# Patient Record
Sex: FEMALE | Race: WHITE | NOT HISPANIC OR LATINO | Employment: FULL TIME | URBAN - METROPOLITAN AREA
[De-identification: names, ages, dates, MRNs, and addresses within clinical notes are randomized per-mention and may not be internally consistent; named-entity substitution may affect disease eponyms.]

---

## 2021-08-19 ENCOUNTER — THERAPY VISIT (OUTPATIENT)
Dept: PHYSICAL THERAPY | Facility: CLINIC | Age: 56
End: 2021-08-19
Payer: COMMERCIAL

## 2021-08-19 DIAGNOSIS — M54.50 CHRONIC BILATERAL LOW BACK PAIN WITHOUT SCIATICA: ICD-10-CM

## 2021-08-19 DIAGNOSIS — G89.29 CHRONIC BILATERAL LOW BACK PAIN WITHOUT SCIATICA: ICD-10-CM

## 2021-08-19 PROCEDURE — 97110 THERAPEUTIC EXERCISES: CPT | Mod: GP | Performed by: PHYSICAL THERAPIST

## 2021-08-19 PROCEDURE — 97161 PT EVAL LOW COMPLEX 20 MIN: CPT | Mod: GP | Performed by: PHYSICAL THERAPIST

## 2021-08-19 NOTE — PROGRESS NOTES
[facility] Initial Evaluation     Present: no    Subjective:  Elly Gonzalez is a 56 year old female with a lumbar condition. Pt reports that she's had back pain on and off over the years without known cause. 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 Chief complaint is worst in the morning, sitting, lifting and with certain postures. Would like to be able to do the rowing machine and build up activity pain free. Denies vague symptoms.      Symptoms commenced as a result of: sitting while working. Condition occurred in the following environment: home/other. Onset of symptoms: Jan 2021. Location of symptoms: Lower lumbar right more than left. Pain level on number scale: 2/10. Quality of pain: shooting, ache. Associated symptoms: groin pain on the left at times. Pain frequency (constant/intermittent): intermittent. Symptoms are exacerbated by: first thing,sitting position, bending, twisting, vacuuming, lifting. Symptoms are relieved by: ice(short term). Progression of symptoms since onset (same/better/worse): same. Special tests (x-ray, MRI, CT scan, EMG, bone scan): Xray unlimited. Previous treatment: Chiro. Improvement with previous treatment: yes not long lasting. General health as reported by patient is good. Pertinent medical history includes: See Epic. Medical allergies: see Epic. Other pertinent surgeries: see Epic. Current medications: See Epic. Occupation: Professor. Patient is (working in normal job without restrictions/working in normal job with restrictions/working in an alternate job/not working due to present treatment problem): normal from home. Primary job tasks: sitting, computer work. Barriers at home/work: None reported by patient. Red flags: None reported by patient.    Objective  Posture: forward head, rounded shoulders    Gait: normal    Screening: + dis-concordant hip pain left (+ FADDIR, +  EFRAÍN)    Flexibility: unremarkable    Lumbar Movement Loss Response   Flexion To the floor pain free and with repeated motion no change   Extension Slightly limited slight pain in the lower lumbar right and with repeated motion getting a bit worse   Side bending/glide L Slightly limited pain lumbar left   Side bending/glide R Full no pain   Rotation L Full thoracic pain   Rotation R Full thoracic pain    Quadrants (if applicable)      Hip PROM/Strength: ROM R flexion 115(slight lumbar pain) IR 20, ER 35. L hip flexion 120, IR 15*, ER 25*    Neurological:    Myotomes L R   L1-2 (hip flexion) 5/5 5/5   L3 (knee extension) 5/5 5/5   L4 (ankle DF) 5/5 5/5   L5 (g. toe ext) 5/5 5/5   S1 (ankle PF or knee flex) 5/5 5/5     Sensory Deficit: unremarkable    Dural Signs L R   Slump - -   SLR         Palpation: tao lumbar paraspinals mid and lower    Prone Assessment: MARLYN hard to get in but improves with prolonged positioning, Pressups slightly limited and with repeated motion increased pain    Accessory Motion: CPA L1-L2,L4, L5 tender and worse with repeated motion    Functional Squat and Balance: poor squat with back pain and SLS fair on the R good on the L     Other Tests: none      Assessment/Plan:    Patient is a 56 year old female with lumbar complaints.    Patient has the following significant findings with corresponding treatment plan.                Diagnosis 1:  Chronic lumbar pain  Pain -  manual therapy, self management, education, directional preference exercise and home program  Decreased ROM/flexibility - manual therapy and therapeutic exercise  Decreased joint mobility - manual therapy and therapeutic exercise  Decreased strength - therapeutic exercise and therapeutic activities  Impaired muscle performance - neuro re-education  Decreased function - therapeutic activities  Impaired posture - neuro re-education    Therapy Evaluation Codes:   1) History comprised of:   Personal factors that impact the plan of  care:      Time since onset of symptoms.    Comorbidity factors that impact the plan of care are:      Migraines/headaches.     Medications impacting care: None.  2) Examination of Body Systems comprised of:   Body structures and functions that impact the plan of care:      Cervical spine, Elbow and Lumbar spine.   Activity limitations that impact the plan of care are:      Bending, Lifting, Sitting, Squatting/kneeling, Stairs, Standing, Working, Sleeping and Laying down.  3) Clinical presentation characteristics are:   Stable/Uncomplicated.  4) Decision-Making    Low complexity using standardized patient assessment instrument and/or measureable assessment of functional outcome.  Cumulative Therapy Evaluation is: Low complexity.    Previous and current functional limitations:  (See Goal Flow Sheet for this information)    Short term and Long term goals: (See Goal Flow Sheet for this information)     Communication ability:  Patient appears to be able to clearly communicate and understand verbal and written communication and follow directions correctly.  Treatment Explanation - The following has been discussed with the patient:   RX ordered/plan of care  Anticipated outcomes  Possible risks and side effects  This patient would benefit from PT intervention to resume normal activities.   Rehab potential is good.    Frequency:  1 X week, once daily  Duration:  for 6 weeks  Discharge Plan:  Achieve all LTG.  Independent in home treatment program.  Reach maximal therapeutic benefit.    Please refer to the daily flowsheet for treatment today, total treatment time and time spent performing 1:1 timed codes.     Please Contact me with any questions or concerns. Thank you for for patience and cooperation.     Bernardo Saunders PT, DPT, [facility]  Physical Therapist  [facility] [place]  [phone]

## 2021-08-24 ENCOUNTER — HOSPITAL ENCOUNTER (OUTPATIENT)
Dept: MAMMOGRAPHY | Facility: CLINIC | Age: 56
Discharge: HOME | End: 2021-08-24
Admitting: NURSE PRACTITIONER
Payer: COMMERCIAL

## 2021-08-24 DIAGNOSIS — Z12.31 VISIT FOR SCREENING MAMMOGRAM: ICD-10-CM

## 2021-08-24 PROCEDURE — 77063 BREAST TOMOSYNTHESIS BI: CPT

## 2021-09-01 ENCOUNTER — THERAPY VISIT (OUTPATIENT)
Dept: PHYSICAL THERAPY | Facility: CLINIC | Age: 56
End: 2021-09-01
Payer: COMMERCIAL

## 2021-09-01 DIAGNOSIS — M54.50 CHRONIC BILATERAL LOW BACK PAIN WITHOUT SCIATICA: Primary | ICD-10-CM

## 2021-09-01 DIAGNOSIS — G89.29 CHRONIC BILATERAL LOW BACK PAIN WITHOUT SCIATICA: Primary | ICD-10-CM

## 2021-09-01 PROCEDURE — 97110 THERAPEUTIC EXERCISES: CPT | Mod: GP | Performed by: PHYSICAL THERAPIST

## 2021-09-01 PROCEDURE — 97140 MANUAL THERAPY 1/> REGIONS: CPT | Mod: GP | Performed by: PHYSICAL THERAPIST

## 2021-09-16 ENCOUNTER — HOSPITAL ENCOUNTER (OUTPATIENT)
Dept: MAMMOGRAPHY | Facility: CLINIC | Age: 56
Discharge: HOME | End: 2021-09-16
Attending: INTERNAL MEDICINE | Admitting: INTERNAL MEDICINE
Payer: COMMERCIAL

## 2021-09-16 DIAGNOSIS — R92.8 ABNORMAL MAMMOGRAM: ICD-10-CM

## 2021-09-16 PROCEDURE — 76642 ULTRASOUND BREAST LIMITED: CPT | Mod: RT

## 2021-09-23 ENCOUNTER — HOSPITAL ENCOUNTER (OUTPATIENT)
Dept: MAMMOGRAPHY | Facility: CLINIC | Age: 56
End: 2021-09-23
Attending: INTERNAL MEDICINE
Payer: COMMERCIAL

## 2021-09-23 DIAGNOSIS — R92.8 ABNORMAL MAMMOGRAM: ICD-10-CM

## 2021-09-23 PROCEDURE — 250N000009 HC RX 250: Performed by: INTERNAL MEDICINE

## 2021-09-23 PROCEDURE — 999N000065 MA POST PROCEDURE RIGHT

## 2021-09-23 PROCEDURE — 272N000031 US BREAST BIOPSY CORE NEEDLE RIGHT

## 2021-09-23 PROCEDURE — 88305 TISSUE EXAM BY PATHOLOGIST: CPT | Mod: TC | Performed by: INTERNAL MEDICINE

## 2021-09-23 RX ORDER — CETIRIZINE HYDROCHLORIDE 10 MG/1
10 TABLET ORAL DAILY
COMMUNITY

## 2021-09-23 RX ADMIN — LIDOCAINE HYDROCHLORIDE 5 ML: 10 INJECTION, SOLUTION INFILTRATION; PERINEURAL at 08:21

## 2021-09-23 NOTE — DISCHARGE INSTRUCTIONS
After Your Breast Biopsy    Bleeding or bruising: Slight bruising is normal.  If you bleed through the bandage, put direct pressure on the breast.  If you are still bleeding after 20 minutes, call the doctor who ordered the exam.    Bandages: Keep your bandage in place until tomorrow morning.  Do not get it wet. On the second day, cover it with a Band-Aid.    Activity: You may shower the morning after the exam.  No heavy activity (lifting, vacuuming) for 24 hours.    Discomfort: Wear your bra overnight to support the breast.  You may take Tylenol (acetaminophen) for pain.  If you had a stereotactic of MR-directed biopsy, you may take aspirin or ibuprofen (Advil, Motrin) the morning after your biopsy, unless your doctor tells you not to.    Infection: Infection is rare.  Symptoms include fever, redness, increasing pain and fluid draining from the biopsy site.  If you have any of these symptoms, please call the doctor who ordered your exam.    Results: Results may take up to three business days.  If you have not heard your results in three days, call the Breast Center Nurse at [phone] or [phone].  In rare cases, we may need to do another biopsy.    Call the doctor who ordered your exam if:    You have bleeding that lasts more than 20 minutes.    You have pain that cannot be controlled.    You have signs of infection (fever, redness, drainage or other signs).    You have not had your results within three days.    Nurse navigator: Our nurse navigator is here to answer your questions and help you set up future clinic visits.  Please call [phone].    Thank you for choosing [facility].  Please call us if you have questions or concerns about your biopsy.

## 2021-09-24 LAB
PATH REPORT.COMMENTS IMP SPEC: NORMAL
PATH REPORT.COMMENTS IMP SPEC: NORMAL
PATH REPORT.FINAL DX SPEC: NORMAL
PATH REPORT.GROSS SPEC: NORMAL
PATH REPORT.MICROSCOPIC SPEC OTHER STN: NORMAL
PHOTO IMAGE: NORMAL

## 2021-09-24 PROCEDURE — 88305 TISSUE EXAM BY PATHOLOGIST: CPT | Mod: 26 | Performed by: PATHOLOGY

## 2021-09-24 NOTE — PROGRESS NOTES
After review by [facility] Radiologist, Dr. Earnest Quintanilla, Elly was called and  given her 9/23/2021 Right Breast Biopsy Pathology results (Fibrocystic Changes) and Follow up Recommendations (Annual Screening Mamnmogram).  Elly denies any post biopsy site issues. I encouraged her to notify her doctor if any breast changes or concerns arise.    Final Diagnosis  Breast, 11 o'clock position and 6 cm from the nipple, 0.7 cm size, intermediate suspicion, ultrasound-guided core biopsy:  -Negative for atypia or malignancy.  -Benign breast tissue with mildly proliferative fibrocystic changes    Chantel Koroma BSN, RN  Procedure Nurse  [facility]  [phone]

## 2021-09-30 ENCOUNTER — THERAPY VISIT (OUTPATIENT)
Dept: PHYSICAL THERAPY | Facility: CLINIC | Age: 56
End: 2021-09-30
Payer: COMMERCIAL

## 2021-09-30 DIAGNOSIS — G89.29 CHRONIC BILATERAL LOW BACK PAIN WITHOUT SCIATICA: Primary | ICD-10-CM

## 2021-09-30 DIAGNOSIS — M54.50 CHRONIC BILATERAL LOW BACK PAIN WITHOUT SCIATICA: Primary | ICD-10-CM

## 2021-09-30 PROCEDURE — 97110 THERAPEUTIC EXERCISES: CPT | Mod: GP | Performed by: PHYSICAL THERAPIST

## 2021-09-30 PROCEDURE — 97140 MANUAL THERAPY 1/> REGIONS: CPT | Mod: GP | Performed by: PHYSICAL THERAPIST

## 2021-10-07 ENCOUNTER — THERAPY VISIT (OUTPATIENT)
Dept: PHYSICAL THERAPY | Facility: CLINIC | Age: 56
End: 2021-10-07
Payer: COMMERCIAL

## 2021-10-07 DIAGNOSIS — M54.50 CHRONIC BILATERAL LOW BACK PAIN WITHOUT SCIATICA: Primary | ICD-10-CM

## 2021-10-07 DIAGNOSIS — G89.29 CHRONIC BILATERAL LOW BACK PAIN WITHOUT SCIATICA: Primary | ICD-10-CM

## 2021-10-07 PROCEDURE — 97110 THERAPEUTIC EXERCISES: CPT | Mod: GP | Performed by: PHYSICAL THERAPIST

## 2021-10-07 PROCEDURE — 97140 MANUAL THERAPY 1/> REGIONS: CPT | Mod: GP | Performed by: PHYSICAL THERAPIST

## 2021-10-11 ENCOUNTER — HEALTH MAINTENANCE LETTER (OUTPATIENT)
Age: 56
End: 2021-10-11

## 2021-11-04 ENCOUNTER — THERAPY VISIT (OUTPATIENT)
Dept: PHYSICAL THERAPY | Facility: CLINIC | Age: 56
End: 2021-11-04
Payer: COMMERCIAL

## 2021-11-04 DIAGNOSIS — M54.50 CHRONIC BILATERAL LOW BACK PAIN WITHOUT SCIATICA: ICD-10-CM

## 2021-11-04 DIAGNOSIS — G89.29 CHRONIC BILATERAL LOW BACK PAIN WITHOUT SCIATICA: ICD-10-CM

## 2021-11-04 PROCEDURE — 97110 THERAPEUTIC EXERCISES: CPT | Mod: GP | Performed by: PHYSICAL THERAPIST

## 2021-11-04 PROCEDURE — 97112 NEUROMUSCULAR REEDUCATION: CPT | Mod: GP | Performed by: PHYSICAL THERAPIST

## 2021-12-02 ENCOUNTER — THERAPY VISIT (OUTPATIENT)
Dept: PHYSICAL THERAPY | Facility: CLINIC | Age: 56
End: 2021-12-02
Payer: COMMERCIAL

## 2021-12-02 DIAGNOSIS — M54.50 CHRONIC BILATERAL LOW BACK PAIN WITHOUT SCIATICA: ICD-10-CM

## 2021-12-02 DIAGNOSIS — G89.29 CHRONIC BILATERAL LOW BACK PAIN WITHOUT SCIATICA: ICD-10-CM

## 2021-12-02 PROCEDURE — 97110 THERAPEUTIC EXERCISES: CPT | Mod: GP | Performed by: PHYSICAL THERAPIST

## 2021-12-02 NOTE — PROGRESS NOTES
DISCHARGE REPORT    Progress reporting period is from 8/19/21 to 12/2/21.       SUBJECTIVE  Subjective changes noted by patient:  Subjective: Elly reports that things are getting easier and things are improving. Pain is decreasing but remians with some pain first thing in the morning and with certain quick motions. Working on the exercises regularly with some difficutly with single leg bridges. Using the nordic track regularly as well. Looking for ways to continue to ramp things up. Traveling the [employer] soon for a few weeks and feels ready to d/c to [facility] after today.    Current pain level is 1/10 Current Pain level: 1/10.     Previous pain level was  2/10 Initial Pain level: 2/10.   Changes in function:  Yes (See Goal flowsheet attached for changes in current functional level)  Adverse reaction to treatment or activity: None    OBJECTIVE  Changes noted in objective findings:  Yes, improved ROM, strength, posture, control, and function  Objective: Lumbar AROM: flexion to floor pain free and no change with repeated motion, extension near full no pain and  no change with repeated. SB near full tao pain free, rotation full pain free. CPA mild pain L1-L5, right lower lumbar paraspinal mildly tender. MARLYN slight pain, Pressups near full no pain.      ASSESSMENT/PLAN  Updated problem list and treatment plan: Diagnosis 1:  Chronic lumbar pain  Pain -  manual therapy, self management, education, directional preference exercise and home program  Decreased strength - therapeutic exercise and therapeutic activities  Impaired muscle performance - neuro re-education  Decreased function - therapeutic activities  STG/LTGs have been met or progress has been made towards goals:  Yes (See Goal flow sheet completed today.)  Assessment of Progress: The patient has met all of their long term goals.  The patient's progress has plateaued.  Self Management Plans:  Patient has been instructed in a home treatment program.  Patient is independent  in a home treatment program.  Patient  has been instructed in self management of symptoms.  Patient is independent in self management of symptoms.  I have re-evaluated this patient and find that the nature, scope, duration and intensity of the therapy is appropriate for the medical condition of the patient.  Elly continues to require the following intervention to meet STG and LTG's:  PT intervention is no longer required to meet STG/LTG.    Recommendations:  This patient is ready to be discharged from therapy and continue their home treatment program.    Please refer to the daily flowsheet for treatment today, total treatment time and time spent performing 1:1 timed codes.    Please Contact me with any questions or concerns. Thank you for for patience and cooperation.     Bernardo Saunders PT, DPT, [facility]  Physical Therapist  [facility] [place]  [phone]

## 2022-06-03 ENCOUNTER — IMMUNIZATION (OUTPATIENT)
Dept: NURSING | Facility: CLINIC | Age: 57
End: 2022-06-03
Payer: COMMERCIAL

## 2022-06-03 PROCEDURE — 0054A COVID-19,PF,PFIZER (12+ YRS): CPT

## 2022-06-03 PROCEDURE — 91305 COVID-19,PF,PFIZER (12+ YRS): CPT

## 2022-09-24 ENCOUNTER — HEALTH MAINTENANCE LETTER (OUTPATIENT)
Age: 57
End: 2022-09-24

## 2022-11-29 ENCOUNTER — HOSPITAL ENCOUNTER (OUTPATIENT)
Dept: MAMMOGRAPHY | Facility: CLINIC | Age: 57
Discharge: HOME | End: 2022-11-29
Admitting: RADIOLOGY
Payer: COMMERCIAL

## 2022-11-29 DIAGNOSIS — Z12.31 VISIT FOR SCREENING MAMMOGRAM: ICD-10-CM

## 2022-11-29 PROCEDURE — 77067 SCR MAMMO BI INCL CAD: CPT

## 2023-01-30 ENCOUNTER — HEALTH MAINTENANCE LETTER (OUTPATIENT)
Age: 58
End: 2023-01-30

## 2024-02-22 ENCOUNTER — HOSPITAL ENCOUNTER (OUTPATIENT)
Dept: MAMMOGRAPHY | Facility: CLINIC | Age: 59
Discharge: HOME | End: 2024-02-22
Admitting: PHYSICIAN ASSISTANT
Payer: COMMERCIAL

## 2024-02-22 DIAGNOSIS — Z12.31 VISIT FOR SCREENING MAMMOGRAM: ICD-10-CM

## 2024-02-22 PROCEDURE — 77063 BREAST TOMOSYNTHESIS BI: CPT

## 2024-05-11 ENCOUNTER — HEALTH MAINTENANCE LETTER (OUTPATIENT)
Age: 59
End: 2024-05-11

## 2025-05-17 ENCOUNTER — HEALTH MAINTENANCE LETTER (OUTPATIENT)
Age: 60
End: 2025-05-17